# Patient Record
Sex: FEMALE | Race: WHITE | NOT HISPANIC OR LATINO | URBAN - NONMETROPOLITAN AREA
[De-identification: names, ages, dates, MRNs, and addresses within clinical notes are randomized per-mention and may not be internally consistent; named-entity substitution may affect disease eponyms.]

---

## 2021-04-01 ENCOUNTER — OFFICE VISIT (OUTPATIENT)
Dept: URBAN - NONMETROPOLITAN AREA CLINIC 2 | Facility: CLINIC | Age: 41
End: 2021-04-01
Payer: COMMERCIAL

## 2021-04-01 ENCOUNTER — WEB ENCOUNTER (OUTPATIENT)
Dept: URBAN - NONMETROPOLITAN AREA CLINIC 2 | Facility: CLINIC | Age: 41
End: 2021-04-01

## 2021-04-01 DIAGNOSIS — R10.84 GENERALIZED ABDOMINAL PAIN: ICD-10-CM

## 2021-04-01 DIAGNOSIS — R19.5 LOOSE STOOLS: ICD-10-CM

## 2021-04-01 DIAGNOSIS — R11.0 NAUSEA: ICD-10-CM

## 2021-04-01 DIAGNOSIS — L29.0 ANAL PRURITUS: ICD-10-CM

## 2021-04-01 DIAGNOSIS — R14.0 ABDOMINAL BLOATING: ICD-10-CM

## 2021-04-01 PROCEDURE — 99204 OFFICE O/P NEW MOD 45 MIN: CPT | Performed by: INTERNAL MEDICINE

## 2021-04-01 RX ORDER — DICYCLOMINE HYDROCHLORIDE 10 MG/1
1 CAPSULE CAPSULE ORAL THREE TIMES A DAY
Qty: 90 | Refills: 3 | OUTPATIENT
Start: 2021-04-01 | End: 2021-07-30

## 2021-04-01 NOTE — HPI-TODAY'S VISIT:
Ms. Vilma Traore is a pleasant 39 y/o F referred to GI clinic for evaluation and management of several GI symptoms.  She reports that two years ago she was diagnosed with GERD but these symptoms improved.  However, starting 3 weeks or so ago she developed severe postprandial bloating and generalized abdominal discomfort after eating.  She will also have many loose stools during the day that occur intermittently.  She will get nauseous but does not vomit.  Her weight has been stable.  Abdominal pain is not localized or specific but diffuse and dull around her mid abdomen.  In addition to above symptoms, she has been having itching and swelling around her anus.  She was treated with nystatin and antibiotics for this and this has improved somewhat.

## 2021-04-02 ENCOUNTER — TELEPHONE ENCOUNTER (OUTPATIENT)
Dept: URBAN - NONMETROPOLITAN AREA CLINIC 2 | Facility: CLINIC | Age: 41
End: 2021-04-02

## 2021-04-02 PROBLEM — 197125005: Status: ACTIVE | Noted: 2021-04-02

## 2021-04-02 RX ORDER — DICYCLOMINE HYDROCHLORIDE 10 MG/1
1 CAPSULE CAPSULE ORAL THREE TIMES A DAY
Qty: 90 | Refills: 3
Start: 2021-04-01

## 2021-04-02 RX ORDER — RIFAXIMIN 550 MG/1
1 TABLET TABLET ORAL THREE TIMES A DAY
Qty: 42 TABLET | Refills: 0 | OUTPATIENT
Start: 2021-04-02 | End: 2021-04-16

## 2021-04-03 LAB
C-REACTIVE PROTEIN, QUANT: 1
DEAMIDATED GLIADIN ABS, IGA: 35
DEAMIDATED GLIADIN ABS, IGG: 52
ENDOMYSIAL ANTIBODY IGA: POSITIVE
IMMUNOGLOBULIN A, QN, SERUM: 226
SEDIMENTATION RATE-WESTERGREN: 5
T-TRANSGLUTAMINASE (TTG) IGA: 9
T-TRANSGLUTAMINASE (TTG) IGG: 2

## 2021-04-05 ENCOUNTER — TELEPHONE ENCOUNTER (OUTPATIENT)
Dept: URBAN - METROPOLITAN AREA CLINIC 92 | Facility: CLINIC | Age: 41
End: 2021-04-05

## 2021-04-13 ENCOUNTER — TELEPHONE ENCOUNTER (OUTPATIENT)
Dept: URBAN - METROPOLITAN AREA CLINIC 92 | Facility: CLINIC | Age: 41
End: 2021-04-13

## 2021-04-30 ENCOUNTER — CLAIMS CREATED FROM THE CLAIM WINDOW (OUTPATIENT)
Dept: URBAN - METROPOLITAN AREA CLINIC 4 | Facility: CLINIC | Age: 41
End: 2021-04-30
Payer: COMMERCIAL

## 2021-04-30 ENCOUNTER — OFFICE VISIT (OUTPATIENT)
Dept: URBAN - NONMETROPOLITAN AREA SURGERY CENTER 1 | Facility: SURGERY CENTER | Age: 41
End: 2021-04-30
Payer: COMMERCIAL

## 2021-04-30 ENCOUNTER — TELEPHONE ENCOUNTER (OUTPATIENT)
Dept: URBAN - METROPOLITAN AREA CLINIC 92 | Facility: CLINIC | Age: 41
End: 2021-04-30

## 2021-04-30 DIAGNOSIS — K90.0 CELIAC DISEASE: ICD-10-CM

## 2021-04-30 DIAGNOSIS — K20.0 ALLERGIC EOSINOPHILIC ESOPHAGITIS: ICD-10-CM

## 2021-04-30 DIAGNOSIS — K90.0 ADULT CELIAC DISEASE: ICD-10-CM

## 2021-04-30 DIAGNOSIS — K31.89 REACTIVE GASTROPATHY: ICD-10-CM

## 2021-04-30 DIAGNOSIS — K20.0 EOSINOPHILIC ESOPHAGITIS: ICD-10-CM

## 2021-04-30 DIAGNOSIS — R19.4 ALTERED BOWEL HABITS: ICD-10-CM

## 2021-04-30 DIAGNOSIS — K52.832 LYMPHOCYTIC COLITIS: ICD-10-CM

## 2021-04-30 DIAGNOSIS — K52.832 FOCAL LYMPHOCYTIC COLITIS: ICD-10-CM

## 2021-04-30 PROCEDURE — G8907 PT DOC NO EVENTS ON DISCHARG: HCPCS | Performed by: INTERNAL MEDICINE

## 2021-04-30 PROCEDURE — 88313 SPECIAL STAINS GROUP 2: CPT | Performed by: PATHOLOGY

## 2021-04-30 PROCEDURE — 88305 TISSUE EXAM BY PATHOLOGIST: CPT | Performed by: PATHOLOGY

## 2021-04-30 PROCEDURE — 45380 COLONOSCOPY AND BIOPSY: CPT | Performed by: INTERNAL MEDICINE

## 2021-04-30 PROCEDURE — 88342 IMHCHEM/IMCYTCHM 1ST ANTB: CPT | Performed by: PATHOLOGY

## 2021-04-30 PROCEDURE — 43239 EGD BIOPSY SINGLE/MULTIPLE: CPT | Performed by: INTERNAL MEDICINE

## 2021-04-30 PROCEDURE — 88312 SPECIAL STAINS GROUP 1: CPT | Performed by: PATHOLOGY

## 2021-04-30 RX ORDER — PANTOPRAZOLE SODIUM 40 MG/1
1 TABLET TABLET, DELAYED RELEASE ORAL BID
Qty: 180 TABLET | Refills: 3 | OUTPATIENT
Start: 2021-04-30

## 2021-04-30 RX ORDER — DICYCLOMINE HYDROCHLORIDE 10 MG/1
1 CAPSULE CAPSULE ORAL THREE TIMES A DAY
Qty: 90 | Refills: 3 | Status: ACTIVE | COMMUNITY
Start: 2021-04-01

## 2021-05-05 ENCOUNTER — TELEPHONE ENCOUNTER (OUTPATIENT)
Dept: URBAN - METROPOLITAN AREA CLINIC 92 | Facility: CLINIC | Age: 41
End: 2021-05-05

## 2021-05-05 RX ORDER — BUDESONIDE 3 MG/1
3 CAPSULES CAPSULE, COATED PELLETS ORAL ONCE A DAY
Qty: 90 CAPSULE | Refills: 3 | OUTPATIENT
Start: 2021-05-05

## 2021-05-11 ENCOUNTER — OFFICE VISIT (OUTPATIENT)
Dept: URBAN - METROPOLITAN AREA TELEHEALTH 2 | Facility: TELEHEALTH | Age: 41
End: 2021-05-11
Payer: COMMERCIAL

## 2021-05-11 DIAGNOSIS — K52.832 LYMPHOCYTIC COLITIS: ICD-10-CM

## 2021-05-11 DIAGNOSIS — R19.5 LOOSE STOOLS: ICD-10-CM

## 2021-05-11 DIAGNOSIS — K20.0 EOSINOPHILIC ESOPHAGITIS: ICD-10-CM

## 2021-05-11 DIAGNOSIS — K90.0 CELIAC DISEASE: ICD-10-CM

## 2021-05-11 PROCEDURE — 99213 OFFICE O/P EST LOW 20 MIN: CPT | Performed by: INTERNAL MEDICINE

## 2021-05-11 RX ORDER — DICYCLOMINE HYDROCHLORIDE 10 MG/1
1 CAPSULE CAPSULE ORAL THREE TIMES A DAY
Qty: 90 | Refills: 3 | Status: ACTIVE | COMMUNITY
Start: 2021-04-01

## 2021-05-11 RX ORDER — HYOSCYAMINE SULFATE 0.125 MG
1 TABLET ON THE TONGUE AND ALLOW TO DISSOLVE  AS NEEDED FOR ABDOMINAL PAIN TABLET,DISINTEGRATING ORAL
Qty: 30 LOZENGE | Refills: 6 | OUTPATIENT
Start: 2021-05-11 | End: 2021-12-06

## 2021-05-11 RX ORDER — PANTOPRAZOLE SODIUM 40 MG/1
1 TABLET TABLET, DELAYED RELEASE ORAL BID
Qty: 180 TABLET | Refills: 3 | Status: ACTIVE | COMMUNITY
Start: 2021-04-30

## 2021-05-11 RX ORDER — BUDESONIDE 3 MG/1
3 CAPSULES CAPSULE, COATED PELLETS ORAL ONCE A DAY
Qty: 90 CAPSULE | Refills: 3 | Status: ACTIVE | COMMUNITY
Start: 2021-05-05

## 2021-05-11 NOTE — HPI-TODAY'S VISIT:
Ms. Vilma Traore is a pleasant 41 y/o F referred to GI clinic for evaluation and management of several GI symptoms.  She reports that two years ago she was diagnosed with GERD but these symptoms improved.  However, starting 3 weeks or so ago she developed severe postprandial bloating and generalized abdominal discomfort after eating.  She will also have many loose stools during the day that occur intermittently.  She will get nauseous but does not vomit.  Her weight has been stable.  Abdominal pain is not localized or specific but diffuse and dull around her mid abdomen.  In addition to above symptoms, she has been having itching and swelling around her anus.  She was treated with nystatin and antibiotics for this and this has improved somewhat.   4/21 EGD with ringed eosphagitis-30 eosiniophils per field and sbbx with blunting consisting with celiac disease. +serologies 4/21 colon WNL lymphocyctic colitis on bx.   5/11/21 Vilma returns for f/u of newly dx celiac, EoE, and lymphocytic colitis. She reports since her procedures, she has not had any gluten and hasn't had much in terms of GI complaints as a result. denies loose stool or cramping at this time. we had a long discussion about these dx. SB

## 2021-05-25 ENCOUNTER — OFFICE VISIT (OUTPATIENT)
Dept: URBAN - NONMETROPOLITAN AREA CLINIC 13 | Facility: CLINIC | Age: 41
End: 2021-05-25

## 2021-05-25 RX ORDER — HYOSCYAMINE SULFATE 0.125 MG
1 TABLET ON THE TONGUE AND ALLOW TO DISSOLVE  AS NEEDED FOR ABDOMINAL PAIN TABLET,DISINTEGRATING ORAL
Qty: 30 LOZENGE | Refills: 6 | Status: ACTIVE | COMMUNITY
Start: 2021-05-11 | End: 2021-12-06

## 2021-05-25 RX ORDER — PANTOPRAZOLE SODIUM 40 MG/1
1 TABLET TABLET, DELAYED RELEASE ORAL BID
Qty: 180 TABLET | Refills: 3 | Status: ACTIVE | COMMUNITY
Start: 2021-04-30

## 2021-05-25 RX ORDER — DICYCLOMINE HYDROCHLORIDE 10 MG/1
1 CAPSULE CAPSULE ORAL THREE TIMES A DAY
Qty: 90 | Refills: 3 | Status: ACTIVE | COMMUNITY
Start: 2021-04-01

## 2021-05-25 RX ORDER — BUDESONIDE 3 MG/1
3 CAPSULES CAPSULE, COATED PELLETS ORAL ONCE A DAY
Qty: 90 CAPSULE | Refills: 3 | Status: ACTIVE | COMMUNITY
Start: 2021-05-05

## 2021-07-20 ENCOUNTER — OFFICE VISIT (OUTPATIENT)
Dept: URBAN - METROPOLITAN AREA TELEHEALTH 2 | Facility: TELEHEALTH | Age: 41
End: 2021-07-20
Payer: COMMERCIAL

## 2021-07-20 DIAGNOSIS — K20.0 EOSINOPHILIC ESOPHAGITIS: ICD-10-CM

## 2021-07-20 DIAGNOSIS — R19.5 LOOSE STOOLS: ICD-10-CM

## 2021-07-20 DIAGNOSIS — K52.832 LYMPHOCYTIC COLITIS: ICD-10-CM

## 2021-07-20 DIAGNOSIS — K90.0 CELIAC DISEASE: ICD-10-CM

## 2021-07-20 PROCEDURE — 99213 OFFICE O/P EST LOW 20 MIN: CPT | Performed by: INTERNAL MEDICINE

## 2021-07-20 RX ORDER — BUDESONIDE 3 MG/1
3 CAPSULES CAPSULE, COATED PELLETS ORAL ONCE A DAY
Qty: 90 CAPSULE | Refills: 3 | Status: ACTIVE | COMMUNITY
Start: 2021-05-05

## 2021-07-20 RX ORDER — PANTOPRAZOLE SODIUM 40 MG/1
1 TABLET TABLET, DELAYED RELEASE ORAL BID
Qty: 180 TABLET | Refills: 3 | Status: ACTIVE | COMMUNITY
Start: 2021-04-30

## 2021-07-20 RX ORDER — PANTOPRAZOLE SODIUM 20 MG/1
1 TABLET TABLET, DELAYED RELEASE ORAL ONCE A DAY
Qty: 30 | Refills: 6 | OUTPATIENT
Start: 2021-07-20

## 2021-07-20 RX ORDER — DICYCLOMINE HYDROCHLORIDE 10 MG/1
1 CAPSULE CAPSULE ORAL THREE TIMES A DAY
Qty: 90 | Refills: 3 | Status: ACTIVE | COMMUNITY
Start: 2021-04-01

## 2021-07-20 RX ORDER — HYOSCYAMINE SULFATE 0.125 MG
1 TABLET ON THE TONGUE AND ALLOW TO DISSOLVE  AS NEEDED FOR ABDOMINAL PAIN TABLET,DISINTEGRATING ORAL
Qty: 30 LOZENGE | Refills: 6 | Status: ACTIVE | COMMUNITY
Start: 2021-05-11 | End: 2021-12-06

## 2021-07-20 NOTE — HPI-TODAY'S VISIT:
Ms. Vilma Traore is a pleasant 39 y/o F referred to GI clinic for evaluation and management of several GI symptoms.  She reports that two years ago she was diagnosed with GERD but these symptoms improved.  However, starting 3 weeks or so ago she developed severe postprandial bloating and generalized abdominal discomfort after eating.  She will also have many loose stools during the day that occur intermittently.  She will get nauseous but does not vomit.  Her weight has been stable.  Abdominal pain is not localized or specific but diffuse and dull around her mid abdomen.  In addition to above symptoms, she has been having itching and swelling around her anus.  She was treated with nystatin and antibiotics for this and this has improved somewhat.   4/21 EGD with ringed eosphagitis-30 eosiniophils per field and sbbx with blunting consisting with celiac disease. +serologies 4/21 colon WNL lymphocyctic colitis on bx.   7/20/21 Vilma returns for f/u of newly dx celiac, EoE, and lymphocytic colitis. She reports since her procedures, she has not had any gluten and hasn't had much in terms of GI complaints as a result. denies loose stool or cramping at this time outside of an every once in awhile discomfort.  we had a long discussion about these dx. SB

## 2021-08-19 ENCOUNTER — DASHBOARD ENCOUNTERS (OUTPATIENT)
Age: 41
End: 2021-08-19

## 2021-08-19 ENCOUNTER — OFFICE VISIT (OUTPATIENT)
Dept: URBAN - METROPOLITAN AREA TELEHEALTH 2 | Facility: TELEHEALTH | Age: 41
End: 2021-08-19
Payer: COMMERCIAL

## 2021-08-19 DIAGNOSIS — K90.0 CELIAC DISEASE: ICD-10-CM

## 2021-08-19 DIAGNOSIS — R19.5 LOOSE STOOLS: ICD-10-CM

## 2021-08-19 DIAGNOSIS — K20.0 EOSINOPHILIC ESOPHAGITIS: ICD-10-CM

## 2021-08-19 DIAGNOSIS — K52.832 LYMPHOCYTIC COLITIS: ICD-10-CM

## 2021-08-19 PROCEDURE — 99213 OFFICE O/P EST LOW 20 MIN: CPT | Performed by: INTERNAL MEDICINE

## 2021-08-19 RX ORDER — DICYCLOMINE HYDROCHLORIDE 10 MG/1
1 CAPSULE CAPSULE ORAL THREE TIMES A DAY
Qty: 90 | Refills: 3 | Status: ACTIVE | COMMUNITY
Start: 2021-04-01

## 2021-08-19 RX ORDER — PANTOPRAZOLE SODIUM 40 MG/1
1 TABLET TABLET, DELAYED RELEASE ORAL BID
Qty: 180 TABLET | Refills: 3 | OUTPATIENT
Start: 2021-08-19

## 2021-08-19 RX ORDER — PANTOPRAZOLE SODIUM 20 MG/1
1 TABLET TABLET, DELAYED RELEASE ORAL ONCE A DAY
Qty: 30 | Refills: 6 | Status: ACTIVE | COMMUNITY
Start: 2021-07-20

## 2021-08-19 RX ORDER — HYOSCYAMINE SULFATE 0.125 MG
1 TABLET ON THE TONGUE AND ALLOW TO DISSOLVE  AS NEEDED FOR ABDOMINAL PAIN TABLET,DISINTEGRATING ORAL
Qty: 30 LOZENGE | Refills: 6 | Status: ACTIVE | COMMUNITY
Start: 2021-05-11 | End: 2021-12-06

## 2021-08-19 RX ORDER — PANTOPRAZOLE SODIUM 40 MG/1
1 TABLET TABLET, DELAYED RELEASE ORAL BID
Qty: 180 TABLET | Refills: 3 | Status: ACTIVE | COMMUNITY
Start: 2021-04-30

## 2021-08-19 RX ORDER — BUDESONIDE 3 MG/1
3 CAPSULES CAPSULE, COATED PELLETS ORAL ONCE A DAY
Qty: 90 CAPSULE | Refills: 3 | Status: ACTIVE | COMMUNITY
Start: 2021-05-05

## 2021-08-19 NOTE — HPI-TODAY'S VISIT:
8/19/21: Ms. Traore returns for follow-up of celiac disease, EoE, and lymphocytic colitis.  Since her last clinic visit, she continues to be gluten free.  However, she endorses chest pain and abdominal bloating.  She is off PPI and has not taken any viscous budesonide for her EoE.  She is not having any diarrhea.   7/20/21 Vilma returns for f/u of newly dx celiac, EoE, and lymphocytic colitis. She reports since her procedures, she has not had any gluten and hasn't had much in terms of GI complaints as a result. denies loose stool or cramping at this time outside of an every once in awhile discomfort.  we had a long discussion about these dx. SB  4/21 EGD with ringed eosphagitis-30 eosiniophils per field and sbbx with blunting consisting with celiac disease. +serologies 4/21 colon WNL lymphocyctic colitis on bx.

## 2021-09-21 ENCOUNTER — OFFICE VISIT (OUTPATIENT)
Dept: URBAN - METROPOLITAN AREA TELEHEALTH 2 | Facility: TELEHEALTH | Age: 41
End: 2021-09-21
Payer: COMMERCIAL

## 2021-09-21 DIAGNOSIS — R19.5 LOOSE STOOLS: ICD-10-CM

## 2021-09-21 DIAGNOSIS — K20.0 EOSINOPHILIC ESOPHAGITIS: ICD-10-CM

## 2021-09-21 DIAGNOSIS — K52.832 LYMPHOCYTIC COLITIS: ICD-10-CM

## 2021-09-21 DIAGNOSIS — K90.0 CELIAC DISEASE: ICD-10-CM

## 2021-09-21 PROBLEM — 64226004: Status: ACTIVE | Noted: 2021-05-05

## 2021-09-21 PROBLEM — 396331005: Status: ACTIVE | Noted: 2021-05-11

## 2021-09-21 PROBLEM — 235599003: Status: ACTIVE | Noted: 2021-05-11

## 2021-09-21 PROCEDURE — 99212 OFFICE O/P EST SF 10 MIN: CPT | Performed by: INTERNAL MEDICINE

## 2021-09-21 RX ORDER — DICYCLOMINE HYDROCHLORIDE 10 MG/1
1 CAPSULE CAPSULE ORAL THREE TIMES A DAY
Qty: 90 | Refills: 3 | Status: ACTIVE | COMMUNITY
Start: 2021-04-01

## 2021-09-21 RX ORDER — PANTOPRAZOLE SODIUM 40 MG/1
1 TABLET TABLET, DELAYED RELEASE ORAL BID
Qty: 180 TABLET | Refills: 3 | Status: ACTIVE | COMMUNITY
Start: 2021-04-30

## 2021-09-21 RX ORDER — PANTOPRAZOLE SODIUM 40 MG/1
1 TABLET TABLET, DELAYED RELEASE ORAL BID
Qty: 180 TABLET | Refills: 3 | Status: ACTIVE | COMMUNITY
Start: 2021-08-19

## 2021-09-21 RX ORDER — HYOSCYAMINE SULFATE 0.125 MG
1 TABLET ON THE TONGUE AND ALLOW TO DISSOLVE  AS NEEDED FOR ABDOMINAL PAIN TABLET,DISINTEGRATING ORAL
Qty: 30 LOZENGE | Refills: 6 | Status: ACTIVE | COMMUNITY
Start: 2021-05-11 | End: 2021-12-06

## 2021-09-21 RX ORDER — PANTOPRAZOLE SODIUM 20 MG/1
1 TABLET TABLET, DELAYED RELEASE ORAL ONCE A DAY
Qty: 30 | Refills: 6 | Status: ACTIVE | COMMUNITY
Start: 2021-07-20

## 2021-09-21 RX ORDER — BUDESONIDE 3 MG/1
3 CAPSULES CAPSULE, COATED PELLETS ORAL ONCE A DAY
Qty: 90 CAPSULE | Refills: 3 | Status: ACTIVE | COMMUNITY
Start: 2021-05-05

## 2021-09-21 RX ORDER — PANTOPRAZOLE SODIUM 40 MG/1
1 TABLET TABLET, DELAYED RELEASE ORAL BID
Qty: 180 TABLET | Refills: 3 | OUTPATIENT

## 2021-09-21 NOTE — HPI-TODAY'S VISIT:
9/21/21 Vilma returns for f/u of EoE and celiac. Today, she is feeling well without GI complaint. she gave up almonds, shrimp, and bananas and this resolved GI complaints. she didn't use budesonide. she has stopped PPI. SB  8/19/21: Ms. Traore returns for follow-up of celiac disease, EoE, and lymphocytic colitis.  Since her last clinic visit, she continues to be gluten free.  However, she endorses chest pain and abdominal bloating.  She is off PPI and has not taken any viscous budesonide for her EoE.  She is not having any diarrhea.   7/20/21 Vilma returns for f/u of newly dx celiac, EoE, and lymphocytic colitis. She reports since her procedures, she has not had any gluten and hasn't had much in terms of GI complaints as a result. denies loose stool or cramping at this time outside of an every once in awhile discomfort.  we had a long discussion about these dx. SB  4/21 EGD with ringed eosphagitis-30 eosiniophils per field and sbbx with blunting consisting with celiac disease. +serologies 4/21 colon WNL lymphocyctic colitis on bx.

## 2022-07-09 ENCOUNTER — TELEPHONE ENCOUNTER (OUTPATIENT)
Dept: URBAN - METROPOLITAN AREA CLINIC 121 | Facility: CLINIC | Age: 42
End: 2022-07-09

## 2022-07-09 RX ORDER — BALSALAZIDE DISODIUM 750 MG/1
CAPSULE ORAL
Refills: 3 | OUTPATIENT
Start: 2004-12-21 | End: 2005-02-28

## 2022-07-10 ENCOUNTER — TELEPHONE ENCOUNTER (OUTPATIENT)
Dept: URBAN - METROPOLITAN AREA CLINIC 121 | Facility: CLINIC | Age: 42
End: 2022-07-10

## 2022-07-10 RX ORDER — BALSALAZIDE DISODIUM 750 MG/1
CAPSULE ORAL
Refills: 6 | Status: ACTIVE | COMMUNITY
Start: 2005-02-28